# Patient Record
Sex: MALE | Race: WHITE | Employment: OTHER | URBAN - METROPOLITAN AREA
[De-identification: names, ages, dates, MRNs, and addresses within clinical notes are randomized per-mention and may not be internally consistent; named-entity substitution may affect disease eponyms.]

---

## 2019-09-22 PROBLEM — L97.909 ATHEROSCLEROSIS OF ARTERY OF EXTREMITY WITH ULCERATION (HCC): Chronic | Status: ACTIVE | Noted: 2019-09-22

## 2019-09-22 PROBLEM — I70.299 ATHEROSCLEROSIS OF ARTERY OF EXTREMITY WITH ULCERATION (HCC): Chronic | Status: ACTIVE | Noted: 2019-09-22

## 2019-10-31 ENCOUNTER — HOSPITAL ENCOUNTER (OUTPATIENT)
Dept: INTERVENTIONAL RADIOLOGY/VASCULAR | Facility: HOSPITAL | Age: 74
Discharge: HOME | End: 2019-10-31
Attending: SURGERY | Admitting: SURGERY
Payer: MEDICARE

## 2019-10-31 VITALS
WEIGHT: 175 LBS | SYSTOLIC BLOOD PRESSURE: 123 MMHG | DIASTOLIC BLOOD PRESSURE: 86 MMHG | RESPIRATION RATE: 17 BRPM | OXYGEN SATURATION: 94 % | HEIGHT: 72 IN | HEART RATE: 52 BPM | BODY MASS INDEX: 23.7 KG/M2

## 2019-10-31 DIAGNOSIS — I74.09 ACUTE OCCLUSION OF AORTOILIAC ARTERY (HCC): ICD-10-CM

## 2019-10-31 PROCEDURE — 37221 HC TRANSLUMINAL ANGIOPLASTY W STENT ILIAC UNILAT INIT: CPT

## 2019-10-31 PROCEDURE — 76937 US GUIDE VASCULAR ACCESS: CPT

## 2019-10-31 PROCEDURE — 37223 HC TRANSLUMINAL ANGIOPLASTY W STENT ILIAC EA ADDL: CPT

## 2019-10-31 PROCEDURE — 047J3DZ DILATION OF LEFT EXTERNAL ILIAC ARTERY WITH INTRALUMINAL DEVICE, PERCUTANEOUS APPROACH: ICD-10-PCS | Performed by: SURGERY

## 2019-10-31 PROCEDURE — 047C3DZ DILATION OF RIGHT COMMON ILIAC ARTERY WITH INTRALUMINAL DEVICE, PERCUTANEOUS APPROACH: ICD-10-PCS | Performed by: SURGERY

## 2019-10-31 PROCEDURE — 75630 X-RAY AORTA LEG ARTERIES: CPT

## 2019-10-31 PROCEDURE — 047H3DZ DILATION OF RIGHT EXTERNAL ILIAC ARTERY WITH INTRALUMINAL DEVICE, PERCUTANEOUS APPROACH: ICD-10-PCS | Performed by: SURGERY

## 2019-10-31 PROCEDURE — 047D3DZ DILATION OF LEFT COMMON ILIAC ARTERY WITH INTRALUMINAL DEVICE, PERCUTANEOUS APPROACH: ICD-10-PCS | Performed by: SURGERY

## 2019-10-31 PROCEDURE — 99153 MOD SED SAME PHYS/QHP EA: CPT

## 2019-10-31 PROCEDURE — 99152 MOD SED SAME PHYS/QHP 5/>YRS: CPT

## 2019-10-31 RX ORDER — LIDOCAINE HYDROCHLORIDE 10 MG/ML
INJECTION, SOLUTION EPIDURAL; INFILTRATION; INTRACAUDAL; PERINEURAL
Status: COMPLETED
Start: 2019-10-31 | End: 2019-10-31

## 2019-10-31 RX ORDER — SODIUM CHLORIDE 9 MG/ML
INJECTION, SOLUTION INTRAVENOUS CONTINUOUS
Status: DISCONTINUED | OUTPATIENT
Start: 2019-10-31 | End: 2019-10-31

## 2019-10-31 RX ORDER — MIDAZOLAM HYDROCHLORIDE 1 MG/ML
INJECTION INTRAMUSCULAR; INTRAVENOUS
Status: COMPLETED
Start: 2019-10-31 | End: 2019-10-31

## 2019-10-31 RX ORDER — ASPIRIN 81 MG/1
81 TABLET ORAL DAILY
Qty: 90 TABLET | Refills: 0 | Status: SHIPPED | OUTPATIENT
Start: 2019-10-31

## 2019-10-31 RX ORDER — CLOPIDOGREL BISULFATE 75 MG/1
TABLET ORAL
Status: COMPLETED
Start: 2019-10-31 | End: 2019-10-31

## 2019-10-31 RX ORDER — HEPARIN SODIUM 5000 [USP'U]/ML
INJECTION, SOLUTION INTRAVENOUS; SUBCUTANEOUS
Status: COMPLETED
Start: 2019-10-31 | End: 2019-10-31

## 2019-10-31 NOTE — PROGRESS NOTES
Pt arrived from lab via bed. R/L. Groin puncture site C/d/I soft. Activity restriction reviewed with pt. . Call light in reach, HOB flat. Will continue to monitor. Dr Woo Alanis here, POC reviewed with pt.    1300: hob elevated, pt voided 400 cc clr yellow per ur

## 2019-10-31 NOTE — BRIEF OP NOTE
Pre-Operative Diagnosis: Atherosclerosis with claudication     Post-Operative Diagnosis: same     Procedure Performed:   1. US perc access right and left common iliac arteries  2. Aortogram with iliac projections  3.  Balloon angioplasty and stent of left c

## 2019-11-25 PROBLEM — I70.0 AORTOILIAC STENOSIS (HCC): Status: ACTIVE | Noted: 2019-11-25

## 2019-11-25 PROBLEM — Z95.828 STATUS POST INSERTION OF ILIAC ARTERY STENT: Status: ACTIVE | Noted: 2019-11-25

## 2019-12-02 NOTE — PROCEDURES
[facility]    PATIENT'S NAME: Stewart Perkins   ATTENDING PHYSICIAN: Harrison Fuentes M.D. OPERATING PHYSICIAN: Harrison Fuentes M.D.    PATIENT ACCOUNT#:   [de-identified]    LOCATION:  [place] [address] ED  MEDICAL RECORD #:   [id number] PROCEDURE:  The patient was taken to the catheterization lab, prepped and draped in sterile fashion. Moderate conscious sedation was initiated with a qualified nurse supervising.   Using ultrasound, I percutaneously accessed the right common iliac with a m deployed it within the common iliac artery. To treat the remainder of the distal common iliac and external iliac artery, I used balloon-expandable stents. I used an 8 x 100 and 8 x 40.   These were deployed in standard fashion from the left distal common

## 2020-03-11 PROBLEM — I77.9 CAROTID ARTERY DISEASE (HCC): Status: ACTIVE | Noted: 2020-03-11

## 2020-03-11 PROBLEM — I73.9 PVD (PERIPHERAL VASCULAR DISEASE) (HCC): Status: ACTIVE | Noted: 2020-03-11

## 2020-03-26 PROBLEM — I74.09 ACUTE OCCLUSION OF AORTOILIAC ARTERY (HCC): Status: ACTIVE | Noted: 2020-03-26

## 2020-03-26 PROBLEM — I74.09 ACUTE OCCLUSION OF AORTOILIAC ARTERY (HCC): Status: RESOLVED | Noted: 2020-03-26 | Resolved: 2020-03-26

## 2020-04-15 PROBLEM — Z95.828 STATUS POST INSERTION OF ILIAC ARTERY STENT: Status: RESOLVED | Noted: 2019-11-25 | Resolved: 2020-04-15

## 2020-08-28 NOTE — H&P
"HISTORY OF PRESENT ILLNESS:    Kwasi Gonzalez is a 43 year old male who is seen in follow up for left radial head fracture. Injury occurred on 8/13/20, he is 2 weeks.  Present symptoms: Patient reports no pain in the elbow today. He states he will have mild discomfort with end range of motion in extension, or at nighttime. He has been compliant with use of sling. He denies any further injury or trauma to the left elbow since his last office visit.   Treatments tried to this point: sling, no recent use of ibuprofen or Tylenol     Past Medical History: Unchanged from the visit of 8/14/20. Please refer to that note.       REVIEW OF SYSTEMS:  CONSTITUTIONAL:  NEGATIVE for fever, chills, change in weight  INTEGUMENTARY/SKIN:  NEGATIVE for worrisome rashes, moles or lesions  EYES:  NEGATIVE for vision changes or irritation  ENT/MOUTH:  NEGATIVE for ear, mouth and throat problems  RESP:  NEGATIVE for significant cough or SOB  BREAST:  NEGATIVE for masses, tenderness or discharge  CV:  NEGATIVE for chest pain, palpitations or peripheral edema  GI:  NEGATIVE for nausea, abdominal pain, heartburn, or change in bowel habits  :  Negative   MUSCULOSKELETAL:  See HPI above  NEURO:  NEGATIVE for weakness, dizziness or paresthesias  ENDOCRINE:  NEGATIVE for temperature intolerance, skin/hair changes  HEME/ALLERGY/IMMUNE:  NEGATIVE for bleeding problems  PSYCHIATRIC:  NEGATIVE for changes in mood or affect    PHYSICAL EXAM:  /84 (BP Location: Right arm, Patient Position: Chair, Cuff Size: Adult Regular)   Ht 1.803 m (5' 11\")   Wt 97.1 kg (214 lb)   BMI 29.85 kg/m    Body mass index is 29.85 kg/m .   GENERAL APPEARANCE: healthy, alert and no distress   SKIN: no suspicious lesions or rashes  NEURO: Normal strength and tone, mentation intact and speech normal  VASCULAR:  good pulses, and cappillary refill   LYMPH: no lymphadenopathy   PSYCH:  mentation appears normal and affect normal/bright    MSK:  Not in acute " [facility]  Vascular Surgery Consultation    Dorothy Locke Patient Status:  Outpatient in a Bed    1945 MRN [id number]   [facility] [address] [facility] Attending Nena William MD   Hosp Day # 0 PCP Indu Villasenor MD     History distress  Normal gait  No gross deformities of the left elbow  No noticeable swelling  Distal neurovascular status is intact  Mild tenderness persisting at the radial head region  Lacking 15 degrees of terminal flexion and 15 degrees of terminal extension of the left elbow  Pronation supination was not checked today    IMAGING INTERPRETATION: Unchanged from previous x-rays of August 14, 2020.     ASSESSMENT:  Elbow sprain injury and radial head injury    PLAN:  We visualized x-rays of the left elbow and compared them to the previous x-rays as mentioned above.  As we outlined on his last visit, we will let him start on range of motion exercises of flexion and extension only not supination and pronation.  Specific exercises were demonstrated today.  We will follow-up with him in 3 weeks with a new x-rays.  Most likely at that point, we will let him start doing pronation/supination.           Clark Vaughn MD  Dept. Orthopedic Surgery  [facility]       Disclaimer: This note consists of symbols derived from keyboarding, dictation and/or voice recognition software. As a result, there may be errors in the script that have gone undetected. Please consider this when interpreting information found in this chart.     lymphadenopathy, thyroid wnl  CAROTID: No bruits  RESPIRATORY: no rales, rhonchi, or wheezes B  CARDIO: RRR without murmur, no murmur, no gallop   ABDOMEN: soft, non-tender with no palpable aneurysm or masses  BACK: normal, no tenderness  SKIN: no rashes,

## 2021-03-15 PROBLEM — I65.29 CAROTID ATHEROSCLEROSIS: Status: RESOLVED | Noted: 2021-03-15 | Resolved: 2021-03-15

## 2021-03-15 PROBLEM — I65.29 CAROTID ATHEROSCLEROSIS: Status: ACTIVE | Noted: 2021-03-15

## 2021-03-15 PROBLEM — I65.23 BILATERAL CAROTID ARTERY STENOSIS: Status: ACTIVE | Noted: 2021-03-15

## 2021-03-15 PROBLEM — I77.9 CAROTID ARTERY DISEASE (HCC): Status: RESOLVED | Noted: 2020-03-11 | Resolved: 2021-03-15

## 2021-04-20 PROBLEM — L97.909 ATHEROSCLEROSIS OF ARTERY OF EXTREMITY WITH ULCERATION (HCC): Chronic | Status: RESOLVED | Noted: 2019-09-22 | Resolved: 2021-04-20

## 2021-04-20 PROBLEM — I70.299 ATHEROSCLEROSIS OF ARTERY OF EXTREMITY WITH ULCERATION (HCC): Chronic | Status: RESOLVED | Noted: 2019-09-22 | Resolved: 2021-04-20

## 2021-04-20 PROBLEM — I70.209 ATHEROSCLEROSIS OF ARTERIES OF EXTREMITIES (HCC): Status: ACTIVE | Noted: 2019-09-22

## (undated) NOTE — LETTER
[facility] [address], [address]  Consent for Procedure/Sedation    Date:        Time:       1.  I authorize the performance upon Lisa Calloway the following: Peripheral angiography, atherectomy, percutaneous transluminal angiopl you may develop a skin reaction.       Signature of Patient: _______________________________________________________    Signature of person authorized                                           Relationship to  to consent for patient: _______________________